# Patient Record
Sex: FEMALE | Race: WHITE | NOT HISPANIC OR LATINO
[De-identification: names, ages, dates, MRNs, and addresses within clinical notes are randomized per-mention and may not be internally consistent; named-entity substitution may affect disease eponyms.]

---

## 2021-07-01 ENCOUNTER — TRANSCRIPTION ENCOUNTER (OUTPATIENT)
Age: 46
End: 2021-07-01

## 2021-12-12 ENCOUNTER — TRANSCRIPTION ENCOUNTER (OUTPATIENT)
Age: 46
End: 2021-12-12

## 2021-12-16 ENCOUNTER — TRANSCRIPTION ENCOUNTER (OUTPATIENT)
Age: 46
End: 2021-12-16

## 2021-12-29 PROBLEM — Z00.00 ENCOUNTER FOR PREVENTIVE HEALTH EXAMINATION: Status: ACTIVE | Noted: 2021-12-29

## 2022-01-06 LAB
HBA1C MFR BLD HPLC: 9.1
LDLC SERPL CALC-MCNC: 57

## 2022-01-07 ENCOUNTER — APPOINTMENT (OUTPATIENT)
Dept: ENDOCRINOLOGY | Facility: CLINIC | Age: 47
End: 2022-01-07
Payer: COMMERCIAL

## 2022-01-07 DIAGNOSIS — Z86.79 PERSONAL HISTORY OF OTHER DISEASES OF THE CIRCULATORY SYSTEM: ICD-10-CM

## 2022-01-07 PROCEDURE — 99204 OFFICE O/P NEW MOD 45 MIN: CPT | Mod: 95

## 2022-01-07 NOTE — REVIEW OF SYSTEMS
[Fatigue] : fatigue [Recent Weight Gain (___ Lbs)] : no recent weight gain [Recent Weight Loss (___ Lbs)] : no recent weight loss [Dysphagia] : no dysphagia [Neck Pain] : no neck pain [Dysphonia] : no dysphonia [Chest Pain] : no chest pain [Shortness Of Breath] : no shortness of breath [Constipation] : no constipation [Diarrhea] : no diarrhea [Polyuria] : no polyuria [Polydipsia] : no polydipsia

## 2022-01-07 NOTE — ASSESSMENT
[FreeTextEntry1] : T2DM\par -Long discussion had with patient regarding her diabetes, medications, diet, and complications. Pt is going through a lot of stress at this time as mom is in a coma under comfort care, she admits to eating her feelings/stress eating.\par -Need updated HGA1C, pt would like to not test blood sugars but understands if HGA1C is higher then goal that she will likely need to start, at least 1x a day'\par -Increase Trulcity to 3.0 mg weekly\par -Continue Farxiga and Glipizide daily\par -Pt needs CDE apt for diet teach\par -Increase exercise as tolerated, goal of 30 mins a day 5x a week\par -Continue to follow up with ophtho and cardiology \par \par \par HTN\par -Continue BP regimen as per cardiology\par \par HLD\par -Continue statin, need updated lipid panel\par \par \par Hypothyroid\par -Continue current dose of LT4- Pt admits to not seperating from other meds in efforts to not forget to take daily\par -Need updated TFTs\par \par NTMNG\par -Need updated thyroid sonogram, pt cannot remember last time she had one done\par \par Fasting labs due ASAP\par RTO ASAP for CDE, 3 months NP

## 2022-01-07 NOTE — HISTORY OF PRESENT ILLNESS
[Home] : at home, [unfilled] , at the time of the visit. [Other Location: e.g. Home (Enter Location, City,State)___] : at [unfilled] [Verbal consent obtained from patient] : the patient, [unfilled] [FreeTextEntry1] : New patient presents with exisiting type 2 diabetes.\par Mother is a patient- she is now on comfort care at [facility].\par Pt going through a lot of stress.\par \par T2DM\par Severity: uncontrolled\par Duration: approx 4 years\par Was controlled by PCP and a different endo the last few years\par \par SMBG\par Does not check blood sugars\par Does not like needles \par \par Last HGA1C 9.1- 8/2021\par \par Current drug regimen\par Trulciity 1.5 mg weekly\par Farxiga 10 mg daily\par Glipizide 2.5 mg daily \par \par Eye exam: last exam 2021- denies DR\par Foot exam: does not see podiatry- denies numbness/tingling in b/l feet\par Kidney disease: denies\par Heart disease: denies- LBBB\par \par Weight: obese- stable\par Diet: "eats her feelings"\par B: eggs, cereal\par L: sandwhich\par D: meat, veggie, starch\par S: chocolate cake-guilty pleasure\par Drink: water, unsweetened iced tea, rare soda/snapple, wine\par Exercise: sedentary\par Smoking: denies\par \par Hypothyroidism/History of thyroid nodules\par Levothyroxine 75 mcg daily \par Patient advised to take every day in the morning, on an empty stomach, and with no other medications. \par Need updated TFTs and thyroid sonogram\par \par HTN\par No BP cuff at home\par Lisinopril 5 mg daily\par Metoprolol 25 mg daily\par \par HLD\par Need updated lipid panel\par Prescribed by cardiology- Atorvastatin 10 mg daily

## 2022-02-05 ENCOUNTER — NON-APPOINTMENT (OUTPATIENT)
Age: 47
End: 2022-02-05

## 2022-02-07 ENCOUNTER — APPOINTMENT (OUTPATIENT)
Dept: ENDOCRINOLOGY | Facility: CLINIC | Age: 47
End: 2022-02-07
Payer: COMMERCIAL

## 2022-02-07 PROCEDURE — G0108 DIAB MANAGE TRN  PER INDIV: CPT

## 2022-02-09 ENCOUNTER — LABORATORY RESULT (OUTPATIENT)
Age: 47
End: 2022-02-09

## 2022-02-10 ENCOUNTER — NON-APPOINTMENT (OUTPATIENT)
Age: 47
End: 2022-02-10

## 2022-04-18 ENCOUNTER — LABORATORY RESULT (OUTPATIENT)
Age: 47
End: 2022-04-18

## 2022-04-21 ENCOUNTER — APPOINTMENT (OUTPATIENT)
Dept: ENDOCRINOLOGY | Facility: CLINIC | Age: 47
End: 2022-04-21
Payer: COMMERCIAL

## 2022-04-21 VITALS
SYSTOLIC BLOOD PRESSURE: 120 MMHG | HEART RATE: 86 BPM | BODY MASS INDEX: 34.74 KG/M2 | OXYGEN SATURATION: 99 % | WEIGHT: 184 LBS | HEIGHT: 61 IN | DIASTOLIC BLOOD PRESSURE: 78 MMHG

## 2022-04-21 LAB — GLUCOSE BLDC GLUCOMTR-MCNC: 136

## 2022-04-21 PROCEDURE — 82962 GLUCOSE BLOOD TEST: CPT

## 2022-04-21 PROCEDURE — 99214 OFFICE O/P EST MOD 30 MIN: CPT | Mod: 25

## 2022-04-21 NOTE — HISTORY OF PRESENT ILLNESS
[FreeTextEntry1] : Mother passed away Jan 2022\par \par T2DM\par Severity: uncontrolled\par Duration: approx 4 years\par Was controlled by PCP and a different endo the last few years\par \par SMBG\par Does not check blood sugars\par Does not like needles \par FS in office - 136, ate one hour ago\par \par Last HGA1C 7.8- 4/2022\par \par Current drug regimen\par Trulciity 3 mg weekly\par Farxiga 10 mg daily\par Glipizide 2.5 mg daily \par \par Eye exam: last exam 4/2021- denies DR, due now \par Foot exam: does not see podiatry- denies numbness/tingling in b/l feet\par Kidney disease: denies\par Heart disease: denies- LBBB\par \par Weight: obese- stable\par Diet: "eats her feelings"\par B: eggs, cereal\par L: sandwhich\par D: meat, veggie, starch\par S: chocolate cake-guilty pleasure\par Drink: water, unsweetened iced tea, rare soda/snapple, wine\par Exercise: sedentary\par Smoking: denies\par \par Hypothyroidism/History of thyroid nodules\par Levothyroxine 75 mcg daily \par Patient advised to take every day in the morning, on an empty stomach, and with no other medications. \par TFTs wnl and thyroid sonogram\par \par HTN\par BP cuff 120/78\par Lisinopril 5 mg daily\par Metoprolol 25 mg daily\par \par HLD\par Triglyceride 135, Total cholesterol 171, HDL 52, LDL 92 \par Prescribed by cardiology- Atorvastatin 10 mg daily

## 2022-04-21 NOTE — ASSESSMENT
[FreeTextEntry1] : T2DM\par -Long discussion had with patient regarding her diabetes, medications, diet, and complications. \par -HGA1C stable, pt would like to not test blood sugars but understands if HGA1C is higher then goal that she will likely need to start, at least 1x a day'\par -Continue Trulcity to 3.0 mg weekly\par -Continue Farxiga and Glipizide daily\par -Pt needs CDE apt for diet teach\par -Increase exercise as tolerated, goal of 30 mins a day 5x a week\par -Continue to follow up with ophtho and cardiology \par \par \par HTN\par -Continue BP regimen as per cardiology\par \par HLD\par -Continue statin, lipid panel at goal \par \par \par Hypothyroid\par -Continue current dose of LT4, TFTs appropriate \par \par NTMNG\par -Need updated thyroid sonogram, pt has rx\par \par Fasting labs due 7/2022\par RTO 3 months NP

## 2022-04-21 NOTE — REVIEW OF SYSTEMS
[Fatigue] : no fatigue [Recent Weight Gain (___ Lbs)] : no recent weight gain [Recent Weight Loss (___ Lbs)] : no recent weight loss [Dysphagia] : no dysphagia [Neck Pain] : no neck pain [Dysphonia] : no dysphonia [Chest Pain] : no chest pain [Shortness Of Breath] : no shortness of breath [Constipation] : no constipation [Diarrhea] : no diarrhea [Polyuria] : no polyuria [Polydipsia] : no polydipsia [de-identified] : +high stress

## 2022-06-02 ENCOUNTER — NON-APPOINTMENT (OUTPATIENT)
Age: 47
End: 2022-06-02

## 2022-09-23 LAB
25(OH)D3 SERPL-MCNC: 30.8 NG/ML
ALBUMIN SERPL ELPH-MCNC: 4.8 G/DL
ALP BLD-CCNC: 122 U/L
ALT SERPL-CCNC: 23 U/L
ANION GAP SERPL CALC-SCNC: 14 MMOL/L
AST SERPL-CCNC: 20 U/L
BASOPHILS # BLD AUTO: 0.06 K/UL
BASOPHILS NFR BLD AUTO: 0.8 %
BILIRUB SERPL-MCNC: 0.3 MG/DL
BUN SERPL-MCNC: 12 MG/DL
CALCIUM SERPL-MCNC: 9.6 MG/DL
CHLORIDE SERPL-SCNC: 106 MMOL/L
CHOLEST SERPL-MCNC: 164 MG/DL
CO2 SERPL-SCNC: 24 MMOL/L
CREAT SERPL-MCNC: 0.73 MG/DL
CREAT SPEC-SCNC: 69 MG/DL
EGFR: 103 ML/MIN/1.73M2
EOSINOPHIL # BLD AUTO: 0.16 K/UL
EOSINOPHIL NFR BLD AUTO: 2.1 %
GLUCOSE SERPL-MCNC: 223 MG/DL
HCT VFR BLD CALC: 46.1 %
HDLC SERPL-MCNC: 46 MG/DL
HGB BLD-MCNC: 15 G/DL
IMM GRANULOCYTES NFR BLD AUTO: 0.5 %
LDLC SERPL CALC-MCNC: 59 MG/DL
LYMPHOCYTES # BLD AUTO: 2.41 K/UL
LYMPHOCYTES NFR BLD AUTO: 31.2 %
MAN DIFF?: NORMAL
MCHC RBC-ENTMCNC: 28.5 PG
MCHC RBC-ENTMCNC: 32.5 GM/DL
MCV RBC AUTO: 87.5 FL
MICROALBUMIN 24H UR DL<=1MG/L-MCNC: 1.5 MG/DL
MICROALBUMIN/CREAT 24H UR-RTO: 21 MG/G
MONOCYTES # BLD AUTO: 0.59 K/UL
MONOCYTES NFR BLD AUTO: 7.6 %
NEUTROPHILS # BLD AUTO: 4.47 K/UL
NEUTROPHILS NFR BLD AUTO: 57.8 %
NONHDLC SERPL-MCNC: 118 MG/DL
PLATELET # BLD AUTO: 249 K/UL
POTASSIUM SERPL-SCNC: 4.3 MMOL/L
PROT SERPL-MCNC: 7.3 G/DL
RBC # BLD: 5.27 M/UL
RBC # FLD: 12.1 %
SODIUM SERPL-SCNC: 144 MMOL/L
T3FREE SERPL-MCNC: 2.96 PG/ML
T4 FREE SERPL-MCNC: 1.2 NG/DL
TRIGL SERPL-MCNC: 295 MG/DL
TSH SERPL-ACNC: 2.52 UIU/ML
VIT B12 SERPL-MCNC: 529 PG/ML
WBC # FLD AUTO: 7.73 K/UL

## 2022-09-26 LAB
ESTIMATED AVERAGE GLUCOSE: 212 MG/DL
HBA1C MFR BLD HPLC: 9 %

## 2022-09-29 ENCOUNTER — APPOINTMENT (OUTPATIENT)
Dept: ENDOCRINOLOGY | Facility: CLINIC | Age: 47
End: 2022-09-29

## 2022-09-29 ENCOUNTER — RESULT CHARGE (OUTPATIENT)
Age: 47
End: 2022-09-29

## 2022-09-29 VITALS
OXYGEN SATURATION: 98 % | HEART RATE: 88 BPM | DIASTOLIC BLOOD PRESSURE: 80 MMHG | BODY MASS INDEX: 34.36 KG/M2 | HEIGHT: 61 IN | SYSTOLIC BLOOD PRESSURE: 130 MMHG | WEIGHT: 182 LBS

## 2022-09-29 LAB — GLUCOSE BLDC GLUCOMTR-MCNC: 170

## 2022-09-29 PROCEDURE — 82962 GLUCOSE BLOOD TEST: CPT

## 2022-09-29 PROCEDURE — 99214 OFFICE O/P EST MOD 30 MIN: CPT | Mod: 25

## 2022-09-29 NOTE — HISTORY OF PRESENT ILLNESS
[FreeTextEntry1] : Mother passed away Jan 2022\par \par T2DM\par Severity: uncontrolled\par Duration: approx 4 years\par Was controlled by PCP and a different endo the last few years\par \par SMBG\par Does not check blood sugars\par Does not like needles \par FS in office - 170\par \par Last HGA1C 9.0 - 9/2022\par \par Current drug regimen\par Trulciity 3 mg weekly\par Farxiga 10 mg daily\par Glipizide 2.5 mg daily \par \par ADMITS TO FORGETTING ALL MEDS 2-3 X A WEEK\par \par Eye exam: last exam 4/2021- denies DR, due now \par Foot exam: does not see podiatry- denies numbness/tingling in b/l feet\par Kidney disease: denies\par Heart disease: denies- LBBB\par \par Weight: obese- stable\par Diet: "eats her feelings"\par B: eggs, cereal\par L: sandwhich\par D: meat, veggie, starch\par S: chocolate cake-guilty pleasure\par Drink: water, unsweetened iced tea, rare soda/snapple, wine\par Exercise: sedentary\par Smoking: denies\par \par Hypothyroidism/History of thyroid nodules\par Levothyroxine 75 mcg daily \par Patient advised to take every day in the morning, on an empty stomach, and with no other medications. \par TFTs wnl \par \par Last thyroid sonogram\par 6/2022- Thyroid sonogram reviewed. Images reviewed. While report reccomends a FNA of right lower lobe, based off images themsleves, we do not feel this is appropriate at this time due to measurements of nodule being taken differently from 2019, nothing suspicious at this time. Will follow with q6 month surveillance. \par \par \par HTN\par BP cuff 130/80\par Lisinopril 5 mg daily\par Metoprolol 25 mg daily\par \par HLD\par Triglyceride 295, Total cholesterol 164, HDL 49, LDL 59\par Prescribed by cardiology- Atorvastatin 10 mg daily

## 2022-09-29 NOTE — ASSESSMENT
[FreeTextEntry1] : T2DM\par -Long discussion had with patient regarding her diabetes, medications, diet, and complications. Must take medication 100% of the time, she often forgets. We talked about checking days off calender. \par -Pt understands if HGA1C is higher then goal that she will likely need to start, at least 1x a day'\par -Continue Trulcity to 3.0 mg weekly\par -Continue Farxiga and Glipizide daily\par -Pt needs CDE apt for diet teach\par -Increase exercise as tolerated, goal of 30 mins a day 5x a week\par -Continue to follow up with ophtho and cardiology \par \par \par HTN\par -Continue BP regimen as per cardiology\par \par HLD\par -Continue statin, lipid panel at goal \par \par Hypothyroid\par -Continue current dose of LT4, TFTs appropriate \par \par NTMNG\par -Need updated thyroid sonogram, pt has rx for 12/2022\par \par Fasting labs due 12/2022\par RTO 3 months NP

## 2022-09-29 NOTE — REVIEW OF SYSTEMS
[Fatigue] : fatigue [Recent Weight Gain (___ Lbs)] : no recent weight gain [Recent Weight Loss (___ Lbs)] : no recent weight loss [Visual Field Defect] : no visual field defect [Blurred Vision] : no blurred vision [Dysphagia] : no dysphagia [Neck Pain] : no neck pain [Dysphonia] : no dysphonia [Chest Pain] : no chest pain [Shortness Of Breath] : no shortness of breath [Constipation] : no constipation [Diarrhea] : no diarrhea [Polyuria] : no polyuria [Polydipsia] : no polydipsia

## 2022-12-22 ENCOUNTER — RX RENEWAL (OUTPATIENT)
Age: 47
End: 2022-12-22

## 2022-12-22 ENCOUNTER — LABORATORY RESULT (OUTPATIENT)
Age: 47
End: 2022-12-22

## 2022-12-27 LAB
25(OH)D3 SERPL-MCNC: 32.2 NG/ML
CHOLEST SERPL-MCNC: 145 MG/DL
HDLC SERPL-MCNC: 53 MG/DL
LDLC SERPL CALC-MCNC: 62 MG/DL
NONHDLC SERPL-MCNC: 92 MG/DL
T3FREE SERPL-MCNC: 2.86 PG/ML
T4 FREE SERPL-MCNC: 1.2 NG/DL
TRIGL SERPL-MCNC: 150 MG/DL
TSH SERPL-ACNC: 2.09 UIU/ML
VIT B12 SERPL-MCNC: 440 PG/ML

## 2022-12-29 ENCOUNTER — TRANSCRIPTION ENCOUNTER (OUTPATIENT)
Age: 47
End: 2022-12-29

## 2022-12-29 ENCOUNTER — APPOINTMENT (OUTPATIENT)
Dept: ENDOCRINOLOGY | Facility: CLINIC | Age: 47
End: 2022-12-29

## 2022-12-29 VITALS
HEART RATE: 101 BPM | WEIGHT: 176 LBS | OXYGEN SATURATION: 98 % | DIASTOLIC BLOOD PRESSURE: 80 MMHG | HEIGHT: 61 IN | BODY MASS INDEX: 33.23 KG/M2 | SYSTOLIC BLOOD PRESSURE: 120 MMHG

## 2022-12-29 LAB — GLUCOSE BLDC GLUCOMTR-MCNC: 116

## 2022-12-29 PROCEDURE — 82962 GLUCOSE BLOOD TEST: CPT

## 2022-12-29 PROCEDURE — 99214 OFFICE O/P EST MOD 30 MIN: CPT | Mod: 25

## 2022-12-29 NOTE — ASSESSMENT
[FreeTextEntry1] : T2DM\par -Long discussion had with patient regarding her diabetes, medications, diet, and complications. Must take medication 100% of the time, doing better with this, HGA1C 7.7, IMPROVED!. \par -Pt understands if HGA1C is higher then goal that she will likely need to start, at least 1x a day\par -Continue Trulcity to 3.0 mg weekly\par -Continue Farxiga and Glipizide daily\par -Pt needs CDE apt for diet teach\par -Increase exercise as tolerated, goal of 30 mins a day 5x a week\par -Continue to follow up with ophtho and cardiology \par \par \par HTN\par -Continue BP regimen as per cardiology\par \par HLD\par -Continue statin, lipid panel at goal \par \par Hypothyroid\par -Continue current dose of LT4, TFTs appropriate \par \par NTMNG\par -Need updated thyroid sonogram results, pt had it done yesterday \par \par Fasting labs due 3/2023\par RTO 3 months NP

## 2022-12-29 NOTE — HISTORY OF PRESENT ILLNESS
[FreeTextEntry1] : Mother passed away Jan 2022\par \par T2DM\par Severity: uncontrolled\par Duration: approx 4 years\par Was controlled by PCP and a different endo the last few years\par \par SMBG\par Does not check blood sugars\par Does not like needles \par FS in office - 116\par \par Last HGA1C 7.7-12/2022\par \par Current drug regimen\par Trulciity 3 mg weekly\par Farxiga 10 mg daily\par Glipizide 2.5 mg daily \par \par Has been better with consistency of taking medications 100% of the time \par \par Eye exam: last exam 4/2021- denies DR, due now \par Foot exam: does not see podiatry- denies numbness/tingling in b/l feet\par Kidney disease: denies\par Heart disease: denies- LBBB\par \par Weight: obese- has lost 6 lbs since 9/2022 \par Diet: "eats her feelings"\par B: eggs, cereal\par L: sandwhich\par D: meat, veggie, starch\par S: chocolate cake-guilty pleasure\par Drink: water, unsweetened iced tea, rare soda/snapple, wine\par Exercise: sedentary\par Smoking: denies\par \par Hypothyroidism/History of thyroid nodules\par Levothyroxine 75 mcg daily \par Patient advised to take every day in the morning, on an empty stomach, and with no other medications. \par TFTs wnl \par \par Last thyroid sonogram\par 6/2022- Thyroid sonogram reviewed. Images reviewed. While report reccomends a FNA of right lower lobe, based off images themsleves, we do not feel this is appropriate at this time due to measurements of nodule being taken differently from 2019, nothing suspicious at this time. Will follow with q6 month surveillance. \par Went for sono yesterday- \par \par HTN\par BP cuff 120/80\par Lisinopril 5 mg daily\par Metoprolol 25 mg daily\par \par HLD\par Lipid panel at goal \par Prescribed by cardiology- Atorvastatin 10 mg daily

## 2022-12-29 NOTE — REVIEW OF SYSTEMS
[Fatigue] : fatigue [Decreased Appetite] : decreased appetite [Recent Weight Loss (___ Lbs)] : recent weight loss: [unfilled] lbs [Visual Field Defect] : no visual field defect [Blurred Vision] : no blurred vision [Dysphagia] : no dysphagia [Neck Pain] : no neck pain [Dysphonia] : no dysphonia [Chest Pain] : no chest pain [Shortness Of Breath] : no shortness of breath [Constipation] : no constipation [Diarrhea] : no diarrhea [Polyuria] : no polyuria [Polydipsia] : no polydipsia

## 2022-12-30 ENCOUNTER — NON-APPOINTMENT (OUTPATIENT)
Age: 47
End: 2022-12-30

## 2023-01-27 ENCOUNTER — NON-APPOINTMENT (OUTPATIENT)
Age: 48
End: 2023-01-27

## 2023-01-27 RX ORDER — LANCETS 33 GAUGE
EACH MISCELLANEOUS
Qty: 1 | Refills: 1 | Status: ACTIVE | COMMUNITY
Start: 2023-01-27 | End: 1900-01-01

## 2023-01-27 RX ORDER — BLOOD-GLUCOSE METER
W/DEVICE EACH MISCELLANEOUS
Qty: 1 | Refills: 0 | Status: ACTIVE | COMMUNITY
Start: 2023-01-27 | End: 1900-01-01

## 2023-01-27 RX ORDER — BLOOD SUGAR DIAGNOSTIC
STRIP MISCELLANEOUS
Qty: 1 | Refills: 1 | Status: ACTIVE | COMMUNITY
Start: 2023-01-27 | End: 1900-01-01

## 2023-03-23 ENCOUNTER — LABORATORY RESULT (OUTPATIENT)
Age: 48
End: 2023-03-23

## 2023-03-29 ENCOUNTER — APPOINTMENT (OUTPATIENT)
Dept: ENDOCRINOLOGY | Facility: CLINIC | Age: 48
End: 2023-03-29
Payer: COMMERCIAL

## 2023-03-29 VITALS
HEIGHT: 61 IN | WEIGHT: 182 LBS | OXYGEN SATURATION: 99 % | BODY MASS INDEX: 34.36 KG/M2 | SYSTOLIC BLOOD PRESSURE: 118 MMHG | HEART RATE: 81 BPM | DIASTOLIC BLOOD PRESSURE: 82 MMHG

## 2023-03-29 LAB — GLUCOSE BLDC GLUCOMTR-MCNC: 182

## 2023-03-29 PROCEDURE — 99214 OFFICE O/P EST MOD 30 MIN: CPT | Mod: 25

## 2023-03-29 PROCEDURE — 82962 GLUCOSE BLOOD TEST: CPT

## 2023-03-29 NOTE — HISTORY OF PRESENT ILLNESS
[FreeTextEntry1] : Mother passed away Jan 2022\par Brother with pancreatic cancer\par \par T2DM\par Severity: uncontrolled\par Duration: approx 4 years\par Was controlled by PCP and a different endo the last few years\par \par SMBG\par Does not check blood sugars\par Does not like needles \par FS in office - 182- fasting but had ice cream last night \par \par Last HGA1C 9.0 - 3/2023 - worse but did have a trulicity 1.5 for a while instead of 3.0 due to national shortage \par \par Current drug regimen\par Trulciity 3 mg weekly (had a month where had to take 1.5 mg due to national shortage) \par Farxiga 10 mg daily\par Glipizide 2.5 mg daily \par \par Admits to not taking medications 100% of the time \par \par Eye exam: last exam 10/2022- denies DR\par Foot exam: does not see podiatry- denies numbness/tingling in b/l feet\par Kidney disease: denies\par Heart disease: denies- LBBB\par \par Weight: obese- gained the weight back that she lost \par Diet: "eats her feelings"\par B: eggs, cereal\par L: sandwhich\par D: meat, veggie, starch\par S: chocolate cake-guilty pleasure\par Drink: water, unsweetened iced tea, rare soda/snapple, wine\par Exercise: sedentary\par Smoking: denies\par \par Hypothyroidism/History of thyroid nodules\par Levothyroxine 75 mcg daily \par Patient advised to take every day in the morning, on an empty stomach, and with no other medications. \par TFTs wnl \par \par Last thyroid sonogram\par 6/2022- Thyroid sonogram reviewed. Images reviewed. While report reccomends a FNA of right lower lobe, based off images themsleves, we do not feel this is appropriate at this time due to measurements of nodule being taken differently from 2019, nothing suspicious at this time. Will follow with q6 month surveillance. \par 12/2022-relatively stable sonogram with largest nodule having decreased in size \par 6 month monitoring remains appropriate \par \par HTN\par BP cuff 118/82\par Lisinopril 5 mg daily\par Metoprolol 25 mg daily\par \par HLD\par LDL at goal --triglycerides slightly elevated \par Prescribed by cardiology- Atorvastatin 10 mg daily

## 2023-03-29 NOTE — ASSESSMENT
[FreeTextEntry1] : T2DM\par -Long discussion had with patient regarding her diabetes, medications, diet, and complications. Must take medication 100% of the time.\par -Continue Trulcity to 3.0 mg weekly\par -Continue Farxiga and Glipizide daily\par -Must watch diet\par -Increase exercise as tolerated, goal of 30 mins a day 5x a week\par -Continue to follow up with ophtho and cardiology \par \par \par HTN\par -Continue BP regimen as per cardiology\par \par HLD\par -Continue statin, lipid panel at goal \par \par Hypothyroid\par -Continue current dose of LT4, TFTs appropriate \par \par NTMNG\par -Plan for updated thyroid sonogram q6 months- pt had it done yesterday \par \par Fasting labs due 6/2023\par RTO 3 months NP

## 2023-03-29 NOTE — REVIEW OF SYSTEMS
[Fatigue] : fatigue [Recent Weight Gain (___ Lbs)] : recent weight gain: [unfilled] lbs [Stress] : stress [Recent Weight Loss (___ Lbs)] : no recent weight loss [Visual Field Defect] : no visual field defect [Blurred Vision] : no blurred vision [Dysphagia] : no dysphagia [Neck Pain] : no neck pain [Dysphonia] : no dysphonia [Chest Pain] : no chest pain [Shortness Of Breath] : no shortness of breath [Constipation] : no constipation [Diarrhea] : no diarrhea [Polyuria] : no polyuria [Polydipsia] : no polydipsia

## 2023-04-20 ENCOUNTER — APPOINTMENT (OUTPATIENT)
Dept: DERMATOLOGY | Facility: CLINIC | Age: 48
End: 2023-04-20

## 2023-06-16 ENCOUNTER — RX RENEWAL (OUTPATIENT)
Age: 48
End: 2023-06-16

## 2023-06-23 ENCOUNTER — LABORATORY RESULT (OUTPATIENT)
Age: 48
End: 2023-06-23

## 2023-06-26 ENCOUNTER — RX RENEWAL (OUTPATIENT)
Age: 48
End: 2023-06-26

## 2023-06-28 ENCOUNTER — APPOINTMENT (OUTPATIENT)
Dept: ENDOCRINOLOGY | Facility: CLINIC | Age: 48
End: 2023-06-28
Payer: COMMERCIAL

## 2023-06-28 VITALS
WEIGHT: 184 LBS | OXYGEN SATURATION: 98 % | HEIGHT: 61 IN | DIASTOLIC BLOOD PRESSURE: 82 MMHG | BODY MASS INDEX: 34.74 KG/M2 | SYSTOLIC BLOOD PRESSURE: 124 MMHG | HEART RATE: 92 BPM

## 2023-06-28 LAB — GLUCOSE BLDC GLUCOMTR-MCNC: 206

## 2023-06-28 PROCEDURE — 99214 OFFICE O/P EST MOD 30 MIN: CPT | Mod: 25

## 2023-06-28 PROCEDURE — 82962 GLUCOSE BLOOD TEST: CPT

## 2023-06-28 RX ORDER — DAPAGLIFLOZIN 10 MG/1
10 TABLET, FILM COATED ORAL
Qty: 90 | Refills: 1 | Status: DISCONTINUED | COMMUNITY
Start: 2022-12-22 | End: 2023-06-28

## 2023-06-28 NOTE — HISTORY OF PRESENT ILLNESS
[FreeTextEntry1] : Mother passed away Jan 2022\par Brother with pancreatic cancer\par \par T2DM\par Severity: uncontrolled\par Duration: approx 4 years\par Was controlled by PCP and a different endo the last few years\par \par SMBG\par Does not check blood sugars\par Does not like needles \par FS in office -206- fasting but had ice cream last night \par \par Last HGA1C 9.0 - 6/2023 -stable\par \par Current drug regimen\par Trulciity 3 mg weekly \par Farxiga 10 mg daily- will no longer be covered, will be jardiance \par Glipizide 2.5 mg daily \par \par Admits to not taking medications 100% of the time \par \par Eye exam: last exam 10/2022- denies DR\par Foot exam: does not see podiatry- denies numbness/tingling in b/l feet\par Kidney disease: denies\par Heart disease: denies- LBBB\par \par Weight: obese- gained the weight back that she lost \par Diet: "eats her feelings"\par B: eggs, cereal\par L: sandwhich\par D: meat, veggie, starch\par S: chocolate cake-guilty pleasure\par Drink: water, unsweetened iced tea, rare soda/snapple, wine\par Exercise: sedentary\par Smoking: denies\par \par Hypothyroidism/History of thyroid nodules\par Levothyroxine 75 mcg daily \par Patient advised to take every day in the morning, on an empty stomach, and with no other medications. \par TFTs wnl \par \par Last thyroid sonogram\par 6/2022- Thyroid sonogram reviewed. Images reviewed. While report reccomends a FNA of right lower lobe, based off images themsleves, we do not feel this is appropriate at this time due to measurements of nodule being taken differently from 2019, nothing suspicious at this time. Will follow with q6 month surveillance. \par 12/2022-relatively stable sonogram with largest nodule having decreased in size \par 6 month monitoring remains appropriate \par \par HTN\par BP cuff 124/82\par Lisinopril 5 mg daily\par Metoprolol 25 mg daily\par \par HLD\par LDL at goal --triglycerides slightly elevated \par Prescribed by cardiology- Atorvastatin 10 mg daily

## 2023-06-28 NOTE — ASSESSMENT
[FreeTextEntry1] : T2DM\par -Long discussion had with patient regarding her diabetes, medications, diet, and complications. Must take medication 100% of the time.\par -Continue Trulcity to 3.0 mg weekly\par -Switch Farxiga to Jardiance for insurance coverage\par -Begin Glipizide 2.5 mg now BID- take second pill with dinner \par -Must watch diet\par -Increase exercise as tolerated, goal of 30 mins a day 5x a week\par -Continue to follow up with ophtho and cardiology \par \par \par HTN\par -Continue BP regimen as per cardiology\par \par HLD\par -Continue statin, lipid panel at goal \par \par Hypothyroid\par -Continue current dose of LT4, TFTs appropriate \par \par NTMNG\par -Plan for updated thyroid sonogram q6 months-rx given\par \par Fasting labs due 9/2023\par RTO 3 months NP

## 2023-09-21 ENCOUNTER — LABORATORY RESULT (OUTPATIENT)
Age: 48
End: 2023-09-21

## 2023-09-26 ENCOUNTER — RX RENEWAL (OUTPATIENT)
Age: 48
End: 2023-09-26

## 2023-09-28 ENCOUNTER — APPOINTMENT (OUTPATIENT)
Dept: ENDOCRINOLOGY | Facility: CLINIC | Age: 48
End: 2023-09-28
Payer: COMMERCIAL

## 2023-09-28 VITALS
HEIGHT: 61 IN | HEART RATE: 92 BPM | BODY MASS INDEX: 34.17 KG/M2 | SYSTOLIC BLOOD PRESSURE: 124 MMHG | WEIGHT: 181 LBS | OXYGEN SATURATION: 98 % | DIASTOLIC BLOOD PRESSURE: 86 MMHG

## 2023-09-28 LAB — GLUCOSE BLDC GLUCOMTR-MCNC: 144

## 2023-09-28 PROCEDURE — 99214 OFFICE O/P EST MOD 30 MIN: CPT | Mod: 25

## 2023-09-28 PROCEDURE — 82962 GLUCOSE BLOOD TEST: CPT

## 2023-09-28 RX ORDER — LANCETS 30 GAUGE
EACH MISCELLANEOUS
Qty: 1 | Refills: 1 | Status: DISCONTINUED | COMMUNITY
Start: 2022-02-07 | End: 2023-09-28

## 2023-09-28 RX ORDER — DULAGLUTIDE 1.5 MG/.5ML
1.5 INJECTION, SOLUTION SUBCUTANEOUS
Qty: 3 | Refills: 1 | Status: DISCONTINUED | COMMUNITY
Start: 2022-01-07 | End: 2023-09-28

## 2023-09-28 RX ORDER — BLOOD SUGAR DIAGNOSTIC
STRIP MISCELLANEOUS DAILY
Qty: 1 | Refills: 1 | Status: DISCONTINUED | COMMUNITY
Start: 2022-02-07 | End: 2023-09-28

## 2023-10-12 ENCOUNTER — RX RENEWAL (OUTPATIENT)
Age: 48
End: 2023-10-12

## 2023-11-27 ENCOUNTER — RX RENEWAL (OUTPATIENT)
Age: 48
End: 2023-11-27

## 2023-12-14 ENCOUNTER — RX RENEWAL (OUTPATIENT)
Age: 48
End: 2023-12-14

## 2023-12-18 ENCOUNTER — LABORATORY RESULT (OUTPATIENT)
Age: 48
End: 2023-12-18

## 2023-12-20 ENCOUNTER — APPOINTMENT (OUTPATIENT)
Dept: ENDOCRINOLOGY | Facility: CLINIC | Age: 48
End: 2023-12-20
Payer: COMMERCIAL

## 2023-12-20 VITALS
HEART RATE: 81 BPM | WEIGHT: 180 LBS | SYSTOLIC BLOOD PRESSURE: 135 MMHG | BODY MASS INDEX: 33.99 KG/M2 | OXYGEN SATURATION: 98 % | DIASTOLIC BLOOD PRESSURE: 80 MMHG | HEIGHT: 61 IN

## 2023-12-20 LAB — GLUCOSE BLDC GLUCOMTR-MCNC: 221

## 2023-12-20 PROCEDURE — 82962 GLUCOSE BLOOD TEST: CPT

## 2023-12-20 PROCEDURE — 99214 OFFICE O/P EST MOD 30 MIN: CPT | Mod: 25

## 2023-12-20 NOTE — ASSESSMENT
[FreeTextEntry1] : T2DM -Long discussion had with patient regarding her diabetes, medications, diet, and complications. Must take medication 100% of the time. -Continue Trulcity to 3.0 mg weekly -Continue Jardiance 25 mg daily with 100% consistency  -Take glipizide 2.5 mg BID with 100% consistency -Must watch diet -Increase exercise as tolerated, goal of 30 mins a day 5x a week -Continue to follow up with ophtho and cardiology   HTN -Continue BP regimen as per cardiology  HLD -Continue statin, lipid panel at goal  Hypothyroid -Continue current dose of LT4 with 100% compliance, TFTs appropriate  NTMNG -Plan for updated thyroid sonogram q6 months-due 2024, rx given  Fasting labs due 3 months RTO 3 months NP, 6 months MD.

## 2023-12-20 NOTE — HISTORY OF PRESENT ILLNESS
[FreeTextEntry1] : Interval history: High stress Work is very busy Daughter being home schooled with concussion  Has periods of tight DM control and periods where she is forgetful with meds. She has tried pill cases and setting alarms on phone but her physical health suffers when her mental health is poor. T2DM Severity: uncontrolled Duration: approx 4 years Was controlled by PCP and a different endo the last few years  SMBG Does not check blood sugars Does not like needles  FS in office -221- coffee with milk  but didnt take any meds yesterday  Last HGA1C 9.4 - 12/2023- worse  Current drug regimen Trulciity 3 mg weekly  Jardiance 25 mg daily (forgets 4/7 days a week)  Glipizide 2.5 mg BID (forgets 4/7 days a week AM dose and 7/7 days on PM dose)  Admits to not taking medications 100% of the time   Eye exam: last exam 9/2023- denies DR Foot exam: does not see podiatry- denies numbness/tingling in b/l feet Kidney disease: denies Heart disease: denies- LBBB  Weight: obese- stable Diet: "eats her feelings" B: eggs, cereal L: sandwhich D: meat, veggie, starch S: chocolate cake-guilty pleasure Drink: water, unsweetened iced tea, rare soda/snapple, wine Exercise: sedentary Smoking: denies  Hypothyroidism/History of thyroid nodules Levothyroxine 75 mcg daily (Takes 4/7 days a week) Patient advised to take every day in the morning, on an empty stomach, and with no other medications.  TFTs wnl   Last thyroid sonogram 6/2022- Thyroid sonogram reviewed. Images reviewed. While report reccomends a FNA of right lower lobe, based off images themsleves, we do not feel this is appropriate at this time due to measurements of nodule being taken differently from 2019, nothing suspicious at this time. Will follow with q6 month surveillance.  12/2022-relatively stable sonogram with largest nodule having decreased in size  8/2023- relatively stable- some nodules stable, some smaller, some slightly larger  6 month monitoring remains appropriate   HTN BP cuff 135/80 Lisinopril 5 mg daily Metoprolol 25 mg daily  HLD LDL at goal --triglycerides elevated but improved Prescribed by cardiology- Atorvastatin 10 mg daily

## 2023-12-20 NOTE — REVIEW OF SYSTEMS
[Dysphagia] : dysphagia [Chest Pain] : chest pain [Stress] : stress [Fatigue] : no fatigue [Recent Weight Gain (___ Lbs)] : no recent weight gain [Recent Weight Loss (___ Lbs)] : no recent weight loss [Visual Field Defect] : no visual field defect [Blurred Vision] : no blurred vision [Neck Pain] : no neck pain [Dysphonia] : no dysphonia [Shortness Of Breath] : no shortness of breath [Constipation] : no constipation [Diarrhea] : no diarrhea [Polyuria] : no polyuria [Polydipsia] : no polydipsia [FreeTextEntry4] : rare [FreeTextEntry5] : intermittent, stress Birth Control Pills Pregnancy And Lactation Text: This medication should be avoided if pregnant and for the first 30 days post-partum.

## 2024-03-22 ENCOUNTER — NON-APPOINTMENT (OUTPATIENT)
Age: 49
End: 2024-03-22

## 2024-03-25 LAB
25(OH)D3 SERPL-MCNC: 39.4 NG/ML
ALBUMIN SERPL ELPH-MCNC: 4.6 G/DL
ALP BLD-CCNC: 137 U/L
ALT SERPL-CCNC: 24 U/L
ANION GAP SERPL CALC-SCNC: 12 MMOL/L
AST SERPL-CCNC: 17 U/L
BASOPHILS # BLD AUTO: 0.07 K/UL
BASOPHILS NFR BLD AUTO: 0.9 %
BILIRUB SERPL-MCNC: 0.3 MG/DL
BUN SERPL-MCNC: 13 MG/DL
CALCIUM SERPL-MCNC: 9.6 MG/DL
CHLORIDE SERPL-SCNC: 105 MMOL/L
CHOLEST SERPL-MCNC: 145 MG/DL
CO2 SERPL-SCNC: 28 MMOL/L
CREAT SERPL-MCNC: 0.81 MG/DL
CREAT SPEC-SCNC: 61 MG/DL
EGFR: 89 ML/MIN/1.73M2
EOSINOPHIL # BLD AUTO: 0.14 K/UL
EOSINOPHIL NFR BLD AUTO: 1.9 %
ESTIMATED AVERAGE GLUCOSE: 212 MG/DL
GLUCOSE SERPL-MCNC: 210 MG/DL
HBA1C MFR BLD HPLC: 9 %
HCT VFR BLD CALC: 46.7 %
HDLC SERPL-MCNC: 49 MG/DL
HGB BLD-MCNC: 14.7 G/DL
IMM GRANULOCYTES NFR BLD AUTO: 0.5 %
LDLC SERPL CALC-MCNC: 67 MG/DL
LYMPHOCYTES # BLD AUTO: 2.15 K/UL
LYMPHOCYTES NFR BLD AUTO: 28.8 %
MAN DIFF?: NORMAL
MCHC RBC-ENTMCNC: 28.1 PG
MCHC RBC-ENTMCNC: 31.5 GM/DL
MCV RBC AUTO: 89.3 FL
MICROALBUMIN 24H UR DL<=1MG/L-MCNC: <1.2 MG/DL
MICROALBUMIN/CREAT 24H UR-RTO: NORMAL MG/G
MONOCYTES # BLD AUTO: 0.6 K/UL
MONOCYTES NFR BLD AUTO: 8 %
NEUTROPHILS # BLD AUTO: 4.47 K/UL
NEUTROPHILS NFR BLD AUTO: 59.9 %
NONHDLC SERPL-MCNC: 96 MG/DL
PLATELET # BLD AUTO: 232 K/UL
POTASSIUM SERPL-SCNC: 4.3 MMOL/L
PROT SERPL-MCNC: 7.4 G/DL
RBC # BLD: 5.23 M/UL
RBC # FLD: 11.9 %
SODIUM SERPL-SCNC: 145 MMOL/L
T3FREE SERPL-MCNC: 2.96 PG/ML
T4 FREE SERPL-MCNC: 1.4 NG/DL
TRIGL SERPL-MCNC: 171 MG/DL
TSH SERPL-ACNC: 1.74 UIU/ML
VIT B12 SERPL-MCNC: 593 PG/ML
WBC # FLD AUTO: 7.47 K/UL

## 2024-04-01 ENCOUNTER — APPOINTMENT (OUTPATIENT)
Dept: ENDOCRINOLOGY | Facility: CLINIC | Age: 49
End: 2024-04-01
Payer: COMMERCIAL

## 2024-04-01 VITALS
SYSTOLIC BLOOD PRESSURE: 120 MMHG | OXYGEN SATURATION: 98 % | BODY MASS INDEX: 33.99 KG/M2 | DIASTOLIC BLOOD PRESSURE: 80 MMHG | HEART RATE: 84 BPM | HEIGHT: 61 IN | WEIGHT: 180 LBS

## 2024-04-01 DIAGNOSIS — E11.9 TYPE 2 DIABETES MELLITUS W/OUT COMPLICATIONS: ICD-10-CM

## 2024-04-01 DIAGNOSIS — I10 ESSENTIAL (PRIMARY) HYPERTENSION: ICD-10-CM

## 2024-04-01 DIAGNOSIS — E66.9 OBESITY, UNSPECIFIED: ICD-10-CM

## 2024-04-01 DIAGNOSIS — E78.5 HYPERLIPIDEMIA, UNSPECIFIED: ICD-10-CM

## 2024-04-01 LAB — GLUCOSE BLDC GLUCOMTR-MCNC: 188

## 2024-04-01 PROCEDURE — G2211 COMPLEX E/M VISIT ADD ON: CPT

## 2024-04-01 PROCEDURE — 82962 GLUCOSE BLOOD TEST: CPT

## 2024-04-01 PROCEDURE — 99214 OFFICE O/P EST MOD 30 MIN: CPT

## 2024-04-01 NOTE — REVIEW OF SYSTEMS
[Fatigue] : fatigue [Decreased Appetite] : decreased appetite [Recent Weight Gain (___ Lbs)] : no recent weight gain [Recent Weight Loss (___ Lbs)] : no recent weight loss [Visual Field Defect] : no visual field defect [Blurred Vision] : no blurred vision [Dysphagia] : no dysphagia [Neck Pain] : no neck pain [Dysphonia] : no dysphonia [Chest Pain] : no chest pain [Palpitations] : no palpitations [Shortness Of Breath] : no shortness of breath [Constipation] : no constipation [Polyuria] : no polyuria [Polydipsia] : no polydipsia

## 2024-04-01 NOTE — HISTORY OF PRESENT ILLNESS
[FreeTextEntry1] : Interval history: Brother passed away Jan 2024 pancreatic cancer  Has periods of tight DM control and periods where she is forgetful with meds. She has tried pill cases and setting alarms on phone but her physical health suffers when her mental health is poor.  T2DM Severity: uncontrolled Duration: approx 4 years Was controlled by PCP and a different endo the last few years  SMBG Does not check blood sugars Does not like needles  FS in office 188  Last HGA1C 9.0- 3/2024- small improvement  Current drug regimen Trulciity 3 mg weekly  Jardiance 25 mg daily (forgets 1/7 days a week)  Glipizide 2.5 mg BID (remembering more- setting a timer in her phone)  Admits to not taking medications 100% of the time   Eye exam: last exam 9/2023- denies DR Foot exam: does not see podiatry- denies numbness/tingling in b/l feet Kidney disease: denies Heart disease: denies- LBBB  Weight: obese- stable Diet: "eats her feelings" B: eggs, cereal L: sandwhich D: meat, veggie, starch S: chocolate cake-guilty pleasure Drink: water, unsweetened iced tea, rare soda/snapple, wine Exercise: sedentary Smoking: denies  Hypothyroidism/History of thyroid nodules Levothyroxine 75 mcg daily (doing better with remembering)  Patient advised to take every day in the morning, on an empty stomach, and with no other medications.  TFTs wnl   Last thyroid sonogram 6/2022- Thyroid sonogram reviewed. Images reviewed. While report reccomends a FNA of right lower lobe, based off images themsleves, we do not feel this is appropriate at this time due to measurements of nodule being taken differently from 2019, nothing suspicious at this time. Will follow with q6 month surveillance.  12/2022-relatively stable sonogram with largest nodule having decreased in size  8/2023- relatively stable- some nodules stable, some smaller, some slightly larger  6 month monitoring remains appropriate   HTN BP cuff 120/80 Lisinopril 5 mg daily Metoprolol 25 mg daily  HLD LDL at goal --triglycerides elevated but improved Prescribed by cardiology- Atorvastatin 10 mg daily

## 2024-04-01 NOTE — ASSESSMENT
[FreeTextEntry1] : T2DM -Long discussion had with patient regarding her diabetes, medications, diet, and complications. Must take medication 100% of the time. -Continue Trulcity to 3.0 mg weekly -Continue Jardiance 25 mg daily with 100% consistency  -Take glipizide 2.5 mg BID with 100% consistency -Must watch diet -Increase exercise as tolerated, goal of 30 mins a day 5x a week -Continue to follow up with ophtho and cardiology   HTN -Continue BP regimen as per cardiology  HLD -Continue statin, lipid panel at goal  Hypothyroid -Continue current dose of LT4 with 100% compliance, TFTs appropriate  NTMNG -Plan for updated thyroid sonogram q6 months-due 2024, rx given  Will obtain copy of abdominal sonogram she had to screen her pancreas with broabilioers history(per pt it was normal)  Fasting labs due 3 months RTO 3 months MD

## 2024-04-11 ENCOUNTER — APPOINTMENT (OUTPATIENT)
Dept: ENDOCRINOLOGY | Facility: CLINIC | Age: 49
End: 2024-04-11

## 2024-05-31 RX ORDER — DULAGLUTIDE 3 MG/.5ML
3 INJECTION, SOLUTION SUBCUTANEOUS
Qty: 3 | Refills: 1 | Status: COMPLETED | COMMUNITY
Start: 2023-09-26 | End: 2024-05-31

## 2024-06-17 ENCOUNTER — NON-APPOINTMENT (OUTPATIENT)
Age: 49
End: 2024-06-17

## 2024-06-17 LAB
25(OH)D3 SERPL-MCNC: 40.3 NG/ML
ALBUMIN SERPL ELPH-MCNC: 4.7 G/DL
ALP BLD-CCNC: 131 U/L
ALT SERPL-CCNC: 20 U/L
ANION GAP SERPL CALC-SCNC: 14 MMOL/L
AST SERPL-CCNC: 16 U/L
BASOPHILS # BLD AUTO: 0.06 K/UL
BASOPHILS NFR BLD AUTO: 0.8 %
BILIRUB SERPL-MCNC: 0.3 MG/DL
BUN SERPL-MCNC: 17 MG/DL
CALCIUM SERPL-MCNC: 10.1 MG/DL
CHLORIDE SERPL-SCNC: 103 MMOL/L
CHOLEST SERPL-MCNC: 163 MG/DL
CO2 SERPL-SCNC: 24 MMOL/L
CREAT SERPL-MCNC: 0.87 MG/DL
CREAT SPEC-SCNC: 84 MG/DL
EGFR: 82 ML/MIN/1.73M2
EOSINOPHIL # BLD AUTO: 0.11 K/UL
EOSINOPHIL NFR BLD AUTO: 1.6 %
ESTIMATED AVERAGE GLUCOSE: 214 MG/DL
GLUCOSE SERPL-MCNC: 254 MG/DL
HBA1C MFR BLD HPLC: 9.1 %
HCT VFR BLD CALC: 46.3 %
HDLC SERPL-MCNC: 48 MG/DL
HGB BLD-MCNC: 14.9 G/DL
IMM GRANULOCYTES NFR BLD AUTO: 0.4 %
LDLC SERPL CALC-MCNC: 73 MG/DL
LYMPHOCYTES # BLD AUTO: 2.03 K/UL
LYMPHOCYTES NFR BLD AUTO: 28.7 %
MAN DIFF?: NORMAL
MCHC RBC-ENTMCNC: 28.5 PG
MCHC RBC-ENTMCNC: 32.2 GM/DL
MCV RBC AUTO: 88.5 FL
MICROALBUMIN 24H UR DL<=1MG/L-MCNC: <1.2 MG/DL
MICROALBUMIN/CREAT 24H UR-RTO: NORMAL MG/G
MONOCYTES # BLD AUTO: 0.45 K/UL
MONOCYTES NFR BLD AUTO: 6.4 %
NEUTROPHILS # BLD AUTO: 4.4 K/UL
NEUTROPHILS NFR BLD AUTO: 62.1 %
NONHDLC SERPL-MCNC: 115 MG/DL
PLATELET # BLD AUTO: 243 K/UL
POTASSIUM SERPL-SCNC: 4.7 MMOL/L
PROT SERPL-MCNC: 7.6 G/DL
RBC # BLD: 5.23 M/UL
RBC # FLD: 12.2 %
SODIUM SERPL-SCNC: 141 MMOL/L
T3FREE SERPL-MCNC: 2.79 PG/ML
T4 FREE SERPL-MCNC: 1.2 NG/DL
TRIGL SERPL-MCNC: 262 MG/DL
TSH SERPL-ACNC: 2.01 UIU/ML
VIT B12 SERPL-MCNC: 648 PG/ML
WBC # FLD AUTO: 7.08 K/UL

## 2024-06-19 ENCOUNTER — APPOINTMENT (OUTPATIENT)
Dept: ENDOCRINOLOGY | Facility: CLINIC | Age: 49
End: 2024-06-19
Payer: COMMERCIAL

## 2024-06-19 VITALS
HEART RATE: 89 BPM | SYSTOLIC BLOOD PRESSURE: 110 MMHG | WEIGHT: 180 LBS | HEIGHT: 61 IN | BODY MASS INDEX: 33.99 KG/M2 | OXYGEN SATURATION: 98 % | DIASTOLIC BLOOD PRESSURE: 70 MMHG

## 2024-06-19 DIAGNOSIS — E11.65 TYPE 2 DIABETES MELLITUS WITH HYPERGLYCEMIA: ICD-10-CM

## 2024-06-19 DIAGNOSIS — E03.9 HYPOTHYROIDISM, UNSPECIFIED: ICD-10-CM

## 2024-06-19 DIAGNOSIS — E04.1 NONTOXIC SINGLE THYROID NODULE: ICD-10-CM

## 2024-06-19 LAB — GLUCOSE BLDC GLUCOMTR-MCNC: 237

## 2024-06-19 PROCEDURE — 82962 GLUCOSE BLOOD TEST: CPT

## 2024-06-19 PROCEDURE — G2211 COMPLEX E/M VISIT ADD ON: CPT

## 2024-06-19 PROCEDURE — 99204 OFFICE O/P NEW MOD 45 MIN: CPT

## 2024-06-19 PROCEDURE — 95250 CONT GLUC MNTR PHYS/QHP EQP: CPT

## 2024-06-19 RX ORDER — LISINOPRIL 5 MG/1
5 TABLET ORAL
Refills: 0 | Status: DISCONTINUED | COMMUNITY
End: 2024-06-19

## 2024-06-19 RX ORDER — SACUBITRIL AND VALSARTAN 49; 51 MG/1; MG/1
TABLET, FILM COATED ORAL
Refills: 0 | Status: ACTIVE | COMMUNITY

## 2024-06-19 RX ORDER — SPIRONOLACTONE 25 MG/1
25 TABLET ORAL
Refills: 0 | Status: ACTIVE | COMMUNITY

## 2024-06-19 RX ORDER — ATORVASTATIN CALCIUM 20 MG/1
20 TABLET, FILM COATED ORAL
Refills: 0 | Status: ACTIVE | COMMUNITY

## 2024-06-19 RX ORDER — SEMAGLUTIDE 2.68 MG/ML
8 INJECTION, SOLUTION SUBCUTANEOUS
Qty: 3 | Refills: 3 | Status: ACTIVE | COMMUNITY
Start: 2024-05-31 | End: 1900-01-01

## 2024-06-19 RX ORDER — EMPAGLIFLOZIN 25 MG/1
25 TABLET, FILM COATED ORAL
Qty: 90 | Refills: 3 | Status: ACTIVE | COMMUNITY
Start: 2023-06-28 | End: 1900-01-01

## 2024-06-19 RX ORDER — GLIPIZIDE 2.5 MG/1
2.5 TABLET, FILM COATED, EXTENDED RELEASE ORAL
Qty: 180 | Refills: 3 | Status: ACTIVE | COMMUNITY
Start: 2023-06-16 | End: 1900-01-01

## 2024-06-19 RX ORDER — PANTOPRAZOLE 40 MG/1
40 TABLET, DELAYED RELEASE ORAL
Refills: 0 | Status: ACTIVE | COMMUNITY

## 2024-06-19 RX ORDER — LEVOTHYROXINE SODIUM 0.07 MG/1
75 TABLET ORAL
Qty: 90 | Refills: 3 | Status: ACTIVE | COMMUNITY
Start: 2022-06-21 | End: 1900-01-01

## 2024-06-19 RX ORDER — METOPROLOL SUCCINATE 50 MG/1
50 TABLET, EXTENDED RELEASE ORAL
Refills: 0 | Status: ACTIVE | COMMUNITY

## 2024-06-19 NOTE — ASSESSMENT
[FreeTextEntry1] : uncontrolled diabetes, with [id number] cardiomyopathy and HFrEF  Plan: increase ozempic to 2 mg weekly apply [employer] pro for pattern analysis, and then revise regimen if needed Enbrel Counseling:  I discussed with the patient the risks of etanercept including but not limited to myelosuppression, immunosuppression, autoimmune hepatitis, demyelinating diseases, lymphoma, and infections.  The patient understands that monitoring is required including a PPD at baseline and must alert us or the primary physician if symptoms of infection or other concerning signs are noted.

## 2024-06-19 NOTE — HISTORY OF PRESENT ILLNESS
[FreeTextEntry1] : Interval history: Brother passed away Jan 2024 pancreatic cancer  Has periods of tight DM control and periods where she is forgetful with meds. She has tried pill cases and setting alarms on phone but her physical health suffers when her mental health is poor.  T2DM Severity: uncontrolled Duration: approx 4 years Was controlled by PCP and a different endo the last few years  SMBG Does not check blood sugars Does not like needles  FS in office 188  Last HGA1C 9.0- 3/2024- small improvement  Current drug regimen Trulciity 3 mg weekly - switched to ozempic 1 mg Jardiance 25 mg daily (forgets 1/7 days a week)  Glipizide 2.5 mg BID (remembering more- setting a timer in her phone) intolerant to metformin  Admits to not taking medications 100% of the time   Eye exam: last exam 9/2023- denies DR Foot exam: does not see podiatry- denies numbness/tingling in b/l feet Kidney disease: denies Heart disease: non-ischemic cardiomyopathy- LBBB. Decreased EF; scheduled for pacemaker  Weight: obese- stable Diet: "eats her feelings" B: eggs, cereal L: sandwhich D: meat, veggie, starch S: chocolate cake-guilty pleasure Drink: water, unsweetened iced tea, rare soda/snapple, wine Exercise: sedentary Smoking: denies  Hypothyroidism/History of thyroid nodules Levothyroxine 75 mcg daily (doing better with remembering)  Patient advised to take every day in the morning, on an empty stomach, and with no other medications.  TFTs wnl   Last thyroid sonogram 6/2022- Thyroid sonogram reviewed. Images reviewed. While report reccomends a FNA of right lower lobe, based off images themsleves, we do not feel this is appropriate at this time due to measurements of nodule being taken differently from 2019, nothing suspicious at this time. Will follow with q6 month surveillance.  12/2022-relatively stable sonogram with largest nodule having decreased in size  8/2023- relatively stable- some nodules stable, some smaller, some slightly larger  6 month monitoring remains appropriate   HTN BP cuff 120/80 Lisinopril 5 mg daily Metoprolol 25 mg daily  HLD LDL at goal --triglycerides elevated but improved Prescribed by cardiology- Atorvastatin 10 mg daily

## 2024-07-08 ENCOUNTER — NON-APPOINTMENT (OUTPATIENT)
Age: 49
End: 2024-07-08

## 2024-07-08 PROCEDURE — 95251 CONT GLUC MNTR ANALYSIS I&R: CPT

## 2024-09-13 ENCOUNTER — LABORATORY RESULT (OUTPATIENT)
Age: 49
End: 2024-09-13

## 2024-09-18 ENCOUNTER — APPOINTMENT (OUTPATIENT)
Dept: ENDOCRINOLOGY | Facility: CLINIC | Age: 49
End: 2024-09-18
Payer: COMMERCIAL

## 2024-09-18 DIAGNOSIS — E66.9 OBESITY, UNSPECIFIED: ICD-10-CM

## 2024-09-18 DIAGNOSIS — E03.9 HYPOTHYROIDISM, UNSPECIFIED: ICD-10-CM

## 2024-09-18 DIAGNOSIS — E04.1 NONTOXIC SINGLE THYROID NODULE: ICD-10-CM

## 2024-09-18 DIAGNOSIS — I10 ESSENTIAL (PRIMARY) HYPERTENSION: ICD-10-CM

## 2024-09-18 DIAGNOSIS — E11.9 TYPE 2 DIABETES MELLITUS W/OUT COMPLICATIONS: ICD-10-CM

## 2024-09-18 DIAGNOSIS — E11.65 TYPE 2 DIABETES MELLITUS WITH HYPERGLYCEMIA: ICD-10-CM

## 2024-09-18 DIAGNOSIS — E78.5 HYPERLIPIDEMIA, UNSPECIFIED: ICD-10-CM

## 2024-09-18 PROCEDURE — G2211 COMPLEX E/M VISIT ADD ON: CPT | Mod: NC

## 2024-09-18 PROCEDURE — 99214 OFFICE O/P EST MOD 30 MIN: CPT

## 2024-09-18 NOTE — ASSESSMENT
[FreeTextEntry1] : T2DM-IMPROVEMENT NOTED -Long discussion had with patient regarding her diabetes, medications, diet, and complications. Must take medication 100% of the time. -Continue Ozempic 2 mg weekly  -Continue Jardiance 25 mg daily with 100% consistency  -Take glipizide 2.5 mg 2 tabs in AM, 1 tabs in PM with 100% consistency -Must watch diet -Increase exercise as tolerated, goal of 30 mins a day 5x a week -Continue to follow up with ophtho and cardiology   HTN -Continue BP regimen as per cardiology  HLD -Continue statin, lipid panel at goal  Hypothyroid -Continue current dose of LT4 with 100% compliance, TFTs appropriate  NTMNG -Plan for updated thyroid sonogram q6 months-due 2025  Fasting labs due 3 months RTO 3 months MD

## 2024-09-18 NOTE — HISTORY OF PRESENT ILLNESS
[Home] : at home, [unfilled] , at the time of the visit. [Medical Office: ([facility])___] : at the medical office located in  [Verbal consent obtained from patient] : the patient, [unfilled] [FreeTextEntry1] : Interval history: Has COVID  Had a pacemaker placed about 6 weeks ago (had to go off ozempic for placement)   Has periods of tight DM control and periods where she is forgetful with meds. She has tried pill cases and setting alarms on phone but her physical health suffers when her mental health is poor.  T2DM Severity: uncontrolled Duration: approx 4 years Was controlled by PCP and a different endo the last few years  SMBG Does not check blood sugars Does not like needles   Last HGA1C 7.7- 9/2024   Current drug regimen Ozempic 2 mg weekly Jardiance 25 mg daily (forgets 1/7 days a week)  Glipizide 2.5 mg 2 in AM, 1 in PM(remembering more- setting a timer in her phone)  Admits to not taking medications 100% of the time   Eye exam: last exam 9/2023- denies DR- due now Foot exam: does not see podiatry- denies numbness/tingling in b/l feet Kidney disease: denies Heart disease: denies- LBBB  Weight: obese- stable Diet: "eats her feelings" but appetite is suppressed sometimes  B: eggs, cereal L: sandwhich D: meat, veggie, starch S: chocolate cake-guilty pleasure Drink: water, unsweetened iced tea, rare soda/snapple, wine Exercise: sedentary Smoking: denies  Hypothyroidism/History of thyroid nodules Levothyroxine 75 mcg daily (doing better with remembering)  Patient advised to take every day in the morning, on an empty stomach, and with no other medications.  TFTs wnl   Last thyroid sonogram 6/2022- Thyroid sonogram reviewed. Images reviewed. While report reccomends a FNA of right lower lobe, based off images themsleves, we do not feel this is appropriate at this time due to measurements of nodule being taken differently from 2019, nothing suspicious at this time. Will follow with q6 month surveillance.  12/2022-relatively stable sonogram with largest nodule having decreased in size  8/2023- relatively stable- some nodules stable, some smaller, some slightly larger  7/2024-  relatively stable- one nodule increased by .1 cm  HTN Does not monitor BP at home Lisinopril 5 mg daily Metoprolol 25 mg daily  HLD LDL at goal --triglycerides elevated but improved Prescribed by cardiology- Atorvastatin 10 mg daily   ALK Phos mildly elevated

## 2024-09-18 NOTE — HISTORY OF PRESENT ILLNESS
[Home] : at home, [unfilled] , at the time of the visit. [Medical Office: ([facility])___] : at the medical office located in  [Verbal consent obtained from patient] : the patient, [unfilled] [FreeTextEntry1] : Interval history: Has COVID  Had a pacemaker placed about 6 weeks ago (had to go off ozempic for placement)   Has periods of tight DM control and periods where she is forgetful with meds. She has tried pill cases and setting alarms on phone but her physical health suffers when her mental health is poor.  T2DM Severity: uncontrolled Duration: approx 4 years Was controlled by PCP and a different endo the last few years  SMBG Does not check blood sugars Does not like needles   Last HGA1C 7.7- 9/2024   Current drug regimen Ozempic 2 mg weekly Jardiance 25 mg daily (forgets 1/7 days a week)  Glipizide 2.5 mg 2 in AM, 1 in PM(remembering more- setting a timer in her phone)  Admits to not taking medications 100% of the time   Eye exam: last exam 9/2023- denies DR- due now Foot exam: does not see podiatry- denies numbness/tingling in b/l feet Kidney disease: denies Heart disease: denies- LBBB  Weight: obese- stable Diet: "eats her feelings" but appetite is suppressed sometimes  B: eggs, cereal L: sandwhich D: meat, veggie, starch S: chocolate cake-guilty pleasure Drink: water, unsweetened iced tea, rare soda/snapple, wine Exercise: sedentary Smoking: denies  Hypothyroidism/History of thyroid nodules Levothyroxine 75 mcg daily (doing better with remembering)  Patient advised to take every day in the morning, on an empty stomach, and with no other medications.  TFTs wnl   Last thyroid sonogram 6/2022- Thyroid sonogram reviewed. Images reviewed. While report reccomends a FNA of right lower lobe, based off images themsleves, we do not feel this is appropriate at this time due to measurements of nodule being taken differently from 2019, nothing suspicious at this time. Will follow with q6 month surveillance.  12/2022-relatively stable sonogram with largest nodule having decreased in size  8/2023- relatively stable- some nodules stable, some smaller, some slightly larger  7/2024-  relatively stable- one nodule increased by .1 cm  HTN Does not monitor BP at home Lisinopril 5 mg daily Metoprolol 25 mg daily  HLD LDL at goal --triglycerides elevated but improved Prescribed by cardiology- Atorvastatin 10 mg daily   ALK Phos mildly elevated

## 2024-12-10 ENCOUNTER — APPOINTMENT (OUTPATIENT)
Dept: ENDOCRINOLOGY | Facility: CLINIC | Age: 49
End: 2024-12-10
Payer: COMMERCIAL

## 2024-12-10 ENCOUNTER — NON-APPOINTMENT (OUTPATIENT)
Age: 49
End: 2024-12-10

## 2024-12-10 VITALS
BODY MASS INDEX: 34.93 KG/M2 | WEIGHT: 185 LBS | OXYGEN SATURATION: 97 % | SYSTOLIC BLOOD PRESSURE: 122 MMHG | HEIGHT: 61 IN | HEART RATE: 96 BPM | DIASTOLIC BLOOD PRESSURE: 84 MMHG

## 2024-12-10 DIAGNOSIS — E03.9 HYPOTHYROIDISM, UNSPECIFIED: ICD-10-CM

## 2024-12-10 DIAGNOSIS — E66.9 OBESITY, UNSPECIFIED: ICD-10-CM

## 2024-12-10 DIAGNOSIS — E11.9 TYPE 2 DIABETES MELLITUS W/OUT COMPLICATIONS: ICD-10-CM

## 2024-12-10 DIAGNOSIS — E11.65 TYPE 2 DIABETES MELLITUS WITH HYPERGLYCEMIA: ICD-10-CM

## 2024-12-10 DIAGNOSIS — E04.1 NONTOXIC SINGLE THYROID NODULE: ICD-10-CM

## 2024-12-10 DIAGNOSIS — E78.5 HYPERLIPIDEMIA, UNSPECIFIED: ICD-10-CM

## 2024-12-10 DIAGNOSIS — I10 ESSENTIAL (PRIMARY) HYPERTENSION: ICD-10-CM

## 2024-12-10 LAB — GLUCOSE BLDC GLUCOMTR-MCNC: 163

## 2024-12-10 PROCEDURE — 82962 GLUCOSE BLOOD TEST: CPT

## 2024-12-10 PROCEDURE — G2211 COMPLEX E/M VISIT ADD ON: CPT | Mod: NC

## 2024-12-10 PROCEDURE — 99214 OFFICE O/P EST MOD 30 MIN: CPT

## 2025-03-11 ENCOUNTER — APPOINTMENT (OUTPATIENT)
Dept: ENDOCRINOLOGY | Facility: CLINIC | Age: 50
End: 2025-03-11
Payer: COMMERCIAL

## 2025-03-11 VITALS
BODY MASS INDEX: 34.17 KG/M2 | SYSTOLIC BLOOD PRESSURE: 116 MMHG | HEIGHT: 61 IN | HEART RATE: 95 BPM | OXYGEN SATURATION: 98 % | DIASTOLIC BLOOD PRESSURE: 82 MMHG | WEIGHT: 181 LBS

## 2025-03-11 DIAGNOSIS — E11.9 TYPE 2 DIABETES MELLITUS W/OUT COMPLICATIONS: ICD-10-CM

## 2025-03-11 DIAGNOSIS — Z79.899 OTHER LONG TERM (CURRENT) DRUG THERAPY: ICD-10-CM

## 2025-03-11 DIAGNOSIS — E66.9 OBESITY, UNSPECIFIED: ICD-10-CM

## 2025-03-11 DIAGNOSIS — E11.65 TYPE 2 DIABETES MELLITUS WITH HYPERGLYCEMIA: ICD-10-CM

## 2025-03-11 DIAGNOSIS — E78.5 HYPERLIPIDEMIA, UNSPECIFIED: ICD-10-CM

## 2025-03-11 DIAGNOSIS — E03.9 HYPOTHYROIDISM, UNSPECIFIED: ICD-10-CM

## 2025-03-11 DIAGNOSIS — I10 ESSENTIAL (PRIMARY) HYPERTENSION: ICD-10-CM

## 2025-03-11 DIAGNOSIS — E04.1 NONTOXIC SINGLE THYROID NODULE: ICD-10-CM

## 2025-03-11 LAB — GLUCOSE BLDC GLUCOMTR-MCNC: 250

## 2025-03-11 PROCEDURE — 99214 OFFICE O/P EST MOD 30 MIN: CPT

## 2025-03-11 PROCEDURE — G2211 COMPLEX E/M VISIT ADD ON: CPT | Mod: NC

## 2025-03-11 PROCEDURE — 82962 GLUCOSE BLOOD TEST: CPT

## 2025-06-09 ENCOUNTER — NON-APPOINTMENT (OUTPATIENT)
Age: 50
End: 2025-06-09

## 2025-06-10 ENCOUNTER — APPOINTMENT (OUTPATIENT)
Dept: ENDOCRINOLOGY | Facility: CLINIC | Age: 50
End: 2025-06-10
Payer: COMMERCIAL

## 2025-06-10 VITALS
HEIGHT: 61 IN | DIASTOLIC BLOOD PRESSURE: 80 MMHG | WEIGHT: 180 LBS | SYSTOLIC BLOOD PRESSURE: 110 MMHG | OXYGEN SATURATION: 98 % | HEART RATE: 82 BPM | BODY MASS INDEX: 33.99 KG/M2

## 2025-06-10 LAB — GLUCOSE BLDC GLUCOMTR-MCNC: 194

## 2025-06-10 PROCEDURE — G2211 COMPLEX E/M VISIT ADD ON: CPT | Mod: NC

## 2025-06-10 PROCEDURE — 82962 GLUCOSE BLOOD TEST: CPT

## 2025-06-10 PROCEDURE — 99214 OFFICE O/P EST MOD 30 MIN: CPT

## 2025-09-16 ENCOUNTER — APPOINTMENT (OUTPATIENT)
Dept: ENDOCRINOLOGY | Facility: CLINIC | Age: 50
End: 2025-09-16
Payer: COMMERCIAL

## 2025-09-16 VITALS
HEART RATE: 82 BPM | BODY MASS INDEX: 33.23 KG/M2 | OXYGEN SATURATION: 97 % | WEIGHT: 176 LBS | HEIGHT: 61 IN | SYSTOLIC BLOOD PRESSURE: 120 MMHG | DIASTOLIC BLOOD PRESSURE: 80 MMHG

## 2025-09-16 DIAGNOSIS — E03.9 HYPOTHYROIDISM, UNSPECIFIED: ICD-10-CM

## 2025-09-16 DIAGNOSIS — E11.65 TYPE 2 DIABETES MELLITUS WITH HYPERGLYCEMIA: ICD-10-CM

## 2025-09-16 DIAGNOSIS — E66.9 OBESITY, UNSPECIFIED: ICD-10-CM

## 2025-09-16 DIAGNOSIS — E11.9 TYPE 2 DIABETES MELLITUS W/OUT COMPLICATIONS: ICD-10-CM

## 2025-09-16 DIAGNOSIS — E04.1 NONTOXIC SINGLE THYROID NODULE: ICD-10-CM

## 2025-09-16 DIAGNOSIS — E78.5 HYPERLIPIDEMIA, UNSPECIFIED: ICD-10-CM

## 2025-09-16 DIAGNOSIS — I10 ESSENTIAL (PRIMARY) HYPERTENSION: ICD-10-CM

## 2025-09-16 DIAGNOSIS — Z79.899 OTHER LONG TERM (CURRENT) DRUG THERAPY: ICD-10-CM

## 2025-09-16 LAB — GLUCOSE BLDC GLUCOMTR-MCNC: 230

## 2025-09-16 PROCEDURE — 82962 GLUCOSE BLOOD TEST: CPT

## 2025-09-16 PROCEDURE — 99214 OFFICE O/P EST MOD 30 MIN: CPT

## 2025-09-16 PROCEDURE — G2211 COMPLEX E/M VISIT ADD ON: CPT | Mod: NC
